# Patient Record
Sex: FEMALE | Race: BLACK OR AFRICAN AMERICAN
[De-identification: names, ages, dates, MRNs, and addresses within clinical notes are randomized per-mention and may not be internally consistent; named-entity substitution may affect disease eponyms.]

---

## 2019-08-29 ENCOUNTER — HOSPITAL ENCOUNTER (EMERGENCY)
Dept: HOSPITAL 72 - EMR | Age: 44
Discharge: HOME | End: 2019-08-29
Payer: COMMERCIAL

## 2019-08-29 VITALS — DIASTOLIC BLOOD PRESSURE: 75 MMHG | SYSTOLIC BLOOD PRESSURE: 145 MMHG

## 2019-08-29 VITALS — BODY MASS INDEX: 51.38 KG/M2 | WEIGHT: 290 LBS | HEIGHT: 63 IN

## 2019-08-29 VITALS — DIASTOLIC BLOOD PRESSURE: 68 MMHG | SYSTOLIC BLOOD PRESSURE: 105 MMHG

## 2019-08-29 DIAGNOSIS — K29.70: Primary | ICD-10-CM

## 2019-08-29 DIAGNOSIS — E11.9: ICD-10-CM

## 2019-08-29 DIAGNOSIS — N83.201: ICD-10-CM

## 2019-08-29 LAB
ADD MANUAL DIFF: NO
ALBUMIN SERPL-MCNC: 3.7 G/DL (ref 3.4–5)
ALBUMIN/GLOB SERPL: 0.8 {RATIO} (ref 1–2.7)
ALP SERPL-CCNC: 100 U/L (ref 46–116)
ALT SERPL-CCNC: 28 U/L (ref 12–78)
ANION GAP SERPL CALC-SCNC: 7 MMOL/L (ref 5–15)
APPEARANCE UR: CLEAR
APTT BLD: 28 SEC (ref 23–33)
APTT PPP: YELLOW S
AST SERPL-CCNC: 19 U/L (ref 15–37)
BASOPHILS NFR BLD AUTO: 1.6 % (ref 0–2)
BILIRUB SERPL-MCNC: 0.5 MG/DL (ref 0.2–1)
BUN SERPL-MCNC: 7 MG/DL (ref 7–18)
CALCIUM SERPL-MCNC: 9.2 MG/DL (ref 8.5–10.1)
CHLORIDE SERPL-SCNC: 105 MMOL/L (ref 98–107)
CO2 SERPL-SCNC: 30 MMOL/L (ref 21–32)
CREAT SERPL-MCNC: 0.7 MG/DL (ref 0.55–1.3)
EOSINOPHIL NFR BLD AUTO: 1.4 % (ref 0–3)
ERYTHROCYTE [DISTWIDTH] IN BLOOD BY AUTOMATED COUNT: 15.2 % (ref 11.6–14.8)
GLOBULIN SER-MCNC: 4.6 G/DL
GLUCOSE UR STRIP-MCNC: NEGATIVE MG/DL
HCT VFR BLD CALC: 48.2 % (ref 37–47)
HGB BLD-MCNC: 16 G/DL (ref 12–16)
INR PPP: 0.9 (ref 0.9–1.1)
KETONES UR QL STRIP: NEGATIVE
LEUKOCYTE ESTERASE UR QL STRIP: (no result)
LYMPHOCYTES NFR BLD AUTO: 33.8 % (ref 20–45)
MCV RBC AUTO: 89 FL (ref 80–99)
MONOCYTES NFR BLD AUTO: 4 % (ref 1–10)
NEUTROPHILS NFR BLD AUTO: 59.2 % (ref 45–75)
NITRITE UR QL STRIP: NEGATIVE
PH UR STRIP: 6 [PH] (ref 4.5–8)
PLATELET # BLD: 254 K/UL (ref 150–450)
POTASSIUM SERPL-SCNC: 4.1 MMOL/L (ref 3.5–5.1)
PROT UR QL STRIP: NEGATIVE
RBC # BLD AUTO: 5.43 M/UL (ref 4.2–5.4)
SODIUM SERPL-SCNC: 142 MMOL/L (ref 136–145)
SP GR UR STRIP: 1.01 (ref 1–1.03)
UROBILINOGEN UR-MCNC: NORMAL MG/DL (ref 0–1)
WBC # BLD AUTO: 7.2 K/UL (ref 4.8–10.8)

## 2019-08-29 PROCEDURE — 85610 PROTHROMBIN TIME: CPT

## 2019-08-29 PROCEDURE — 85730 THROMBOPLASTIN TIME PARTIAL: CPT

## 2019-08-29 PROCEDURE — 81025 URINE PREGNANCY TEST: CPT

## 2019-08-29 PROCEDURE — 96361 HYDRATE IV INFUSION ADD-ON: CPT

## 2019-08-29 PROCEDURE — 86900 BLOOD TYPING SEROLOGIC ABO: CPT

## 2019-08-29 PROCEDURE — 36415 COLL VENOUS BLD VENIPUNCTURE: CPT

## 2019-08-29 PROCEDURE — 86901 BLOOD TYPING SEROLOGIC RH(D): CPT

## 2019-08-29 PROCEDURE — 74177 CT ABD & PELVIS W/CONTRAST: CPT

## 2019-08-29 PROCEDURE — 96374 THER/PROPH/DIAG INJ IV PUSH: CPT

## 2019-08-29 PROCEDURE — 85025 COMPLETE CBC W/AUTO DIFF WBC: CPT

## 2019-08-29 PROCEDURE — 99284 EMERGENCY DEPT VISIT MOD MDM: CPT

## 2019-08-29 PROCEDURE — 80053 COMPREHEN METABOLIC PANEL: CPT

## 2019-08-29 PROCEDURE — 81001 URINALYSIS AUTO W/SCOPE: CPT

## 2019-08-29 PROCEDURE — 86850 RBC ANTIBODY SCREEN: CPT

## 2019-08-29 NOTE — NUR
ED Nurse Note:

pt walked in from home c/o abd pain deborahd tara done blood and urine sent to lab 
pt medicated . pt down to ct now.

## 2019-08-29 NOTE — DIAGNOSTIC IMAGING REPORT
Clinical Indication: Abdominal pain, stomach pain

 

Technique:   No oral contrast utilized, per emergency room physician request  IV

administration nonionic contrast. Venous phase spiral acquisition obtained through

the abdomen and pelvis. Multiplanar reconstructions were generated. Total dose length

product 1333.1 mGycm. CTDIvol(s) 26.25 mGy. Dose reduction achieved using automated

exposure control

 

 

Comparison: none

 

Findings: The lack of enteric contrast limits assessment of the GI tract. No evidence

of diverticulosis or diverticulitis. The appendix is normal. No small bowel

distention. No free or loculated intraperitoneal gas or fluid is evident. The distal

esophagus appears somewhat thick walled. The stomach and duodenum are unremarkable.

There is diastases of the rectus abdominis tendon without jett herniation.

 

The liver, gallbladder, bile ducts, pancreas, spleen, adrenals, kidneys are

unremarkable. No renal or ureteral calculi, hydronephrosis, or hydroureter

demonstrated. No retroperitoneal or mesenteric mass or adenopathy. No pelvic mass or

adenopathy. Uterus is absent. No pelvic mass or adenopathy. There is a 2.7 cm right

ovarian cyst.

 

The included lung bases are clear. The bones demonstrate degenerative spondylosis

changes.

 

Impression: Limited assessment of the GI tract, due to lack of enteric contrast

administration

 

Apparent mild wall thickening of the distal esophagus, could indicate esophagitis.

Correlate with clinical findings

 

No definite acute or significant abnormality otherwise

 

Incidental findings as noted, including benign-appearing right ovarian cyst,

degenerative spondylosis

 

 

 

 

 

The CT scanner at [facility] is accredited by the [employer] College [employer]

[employer]iology and the scans are performed using protocols designed to limit radiation

exposure to as low as reasonably achievable to attain images of sufficient resolution

adequate for diagnostic evaluation.

## 2019-08-29 NOTE — NUR
ER DISCHARGE NOTE:

Patient is cleared to be discharged per PA, pt is aox4, on room air, with 
stable vital signs. pt was given dc and prescription instructions, pt was able 
to verbalize understanding, pt id band and iv site removed without 
complications. pt is able to ambulate with steady gait. pt took all belongings.

## 2019-08-29 NOTE — EMERGENCY ROOM REPORT
History of Present Illness


General


Chief Complaint:  Abdominal Pain


Source:  Patient





Present Illness


HPI


43-year-old female with history of type 2 diabetes currently controlled with 

metformin and morbidly obese here complaining of 2 weeks of epigastric 

abdominal pain with burning sensation in her throat.  Denies complains of 

nausea.  Denies flushness, chest pain, shortness of breath, diarrhea or 

constipation.  Denies blood in her vomit.  Denies fever and chills.  Patient 

was seen by her primary care provider diagnosed with gastritis and given 

pantoprazole however reports that it does not help her.  Patient reports that 

she is a lot of spicy and acidic food.  Patient also is a heavy tobacco smoker.

  Denies other associated symptoms including urinary frequency.  Patient also 

had a total hysterectomy with exclusion of ovaries 2 years ago.


Allergies:  


Coded Allergies:  


     No Known Allergies (Unverified , 8/29/19)





Patient History


Past Medical History:  see triage record


Past Surgical History:  unable to obtain


Pertinent Family History:  none


Last Menstrual Period:  hysterectomy


Pregnant Now:  No


Immunizations:  UTD


Reviewed Nursing Documentation:  PMH: Agreed; PSxH: Agreed





Nursing Documentation-PMH


Past Medical History:  No History, Except For


Hx Diabetes:  Yes - type2





Review of Systems


All Other Systems:  negative except mentioned in HPI





Physical Exam





Vital Signs








  Date Time  Temp Pulse Resp B/P (MAP) Pulse Ox O2 Delivery O2 Flow Rate FiO2


 


8/29/19 12:55 98.2 73 16 105/68 (80) 97 Room Air  








Sp02 EP Interpretation:  reviewed, normal


General Appearance:  no apparent distress, alert, GCS 15, non-toxic


Head:  normocephalic, atraumatic


Eyes:  bilateral eye normal inspection, bilateral eye PERRL


ENT:  hearing grossly normal, normal pharynx, no angioedema, normal voice


Neck:  full range of motion, supple/symm/no masses


Respiratory:  chest non-tender, lungs clear, normal breath sounds, speaking 

full sentences


Cardiovascular #1:  regular rate, rhythm, no edema, no murmur


Gastrointestinal:  normal bowel sounds, non tender, soft, non-distended, no 

rebound, guarding - Epigastric


Genitourinary:  no CVA tenderness


Musculoskeletal:  normal inspection, back normal, digits/nails normal


Neurologic:  alert, oriented x3, responsive, motor strength/tone normal, 

sensory intact, speech normal


Psychiatric:  judgement/insight normal, memory normal, mood/affect normal, no 

suicidal/homicidal ideation


Skin:  no rash


Lymphatic:  no adenopathy





Medical Decision Making


PA Attestation


All my diagnosis and treatment plans were reviewed ad discussed with my 

supervising physician Dr. Davison


Diagnostic Impression:  


 Primary Impression:  


 Right ovarian cyst


 Additional Impression:  


 Gastritis


ER Course


43-year-old female with history of type 2 diabetes currently controlled with 

metformin and morbidly obese here complaining of 2 weeks of epigastric 

abdominal pain with burning sensation in her throat.  Denies complains of 

nausea.  Denies flushness, chest pain, shortness of breath, diarrhea or 

constipation.  Denies blood in her vomit.  Denies fever and chills.  Patient 

was seen by her primary care provider diagnosed with gastritis and given 

pantoprazole however reports that it does not help her.  Patient reports that 

she is a lot of spicy and acidic food.  Patient also is a heavy tobacco smoker.

  Denies other associated symptoms including urinary frequency.  Patient also 

had a total hysterectomy with exclusion of ovaries 2 years ago.





Ddx considered but are not limited to: appendicitis, cholecystis, gastritis, 

gastroenteritis, UTI, pyelonephritis, diverticulitis, ovarian cyst, gastritis














Vital signs: are WNL, pt. is afebrile








 H&PE are most consistent with: Gastritis, right ovarian cyst














ORDERS: abdominal CT, CBC, CMP, UA, lipase, ranitidine














ED INTERVENTIONS: Zofran, Pepcid, NS bolus























DISCHARGE: At this time pt. is stable for d/c to home. Will provide printed 

patient care instructions, and any necessary prescriptions. Care plan and 

follow up instructions have been discussed with the patient prior to discharge.

  Patient to follow-up with a primary care provider for referral to 

gastroenterology and possible endoscopy take medication as directed also 

referred to gynecologist regarding right ovarian cyst if worsening symptoms 

return to the emergency room.


CT/MRI/US Diagnostic Results


CT/MRI/US Diagnostic Results :  


   Imaging Test Ordered:  CT abdomen pelvis with contrast


   Impression


Right 2.7 cm ovarian cyst





Last Vital Signs








  Date Time  Temp Pulse Resp B/P (MAP) Pulse Ox O2 Delivery O2 Flow Rate FiO2


 


8/29/19 14:47  73 16   Room Air  


 


8/29/19 13:26 98.2   105/68 97   








Disposition:  HOME, SELF-CARE


Condition:  Stable


Scripts


Acetaminophen* (TYLENOL EXTRA STRENGTH*) 500 Mg Tablet


500 MG ORAL Q8H PRN for Prn Headache/Temp > 101, #30 TAB 0 Refills


   Prov: Angélica Watters         8/29/19 


Ranitidine Hcl* (ZANTAC*) 150 Mg Tablet


150 MG ORAL TWICE A DAY, #30 TAB


   Prov: Angélica Watters         8/29/19


Referrals:  


[facility],REFERR[facility] (PCP)


Patient Instructions:  Gastritis, Adult, Ovarian Cyst, Easy-to-Read





Additional Instructions:  


Follow-up with your primary care provider for referral to gastroenterology for 

possible endoscopy needed if treatment unsuccessful for gastritis.  Have your 

primary care provider refer you to a gynecologist due to your right ovarian 

cyst.  Return to emergency room if worsening symptoms.











Angélica Watters Aug 29, 2019 15:44